# Patient Record
Sex: FEMALE | Race: BLACK OR AFRICAN AMERICAN | URBAN - METROPOLITAN AREA
[De-identification: names, ages, dates, MRNs, and addresses within clinical notes are randomized per-mention and may not be internally consistent; named-entity substitution may affect disease eponyms.]

---

## 2024-01-22 ENCOUNTER — TELEPHONE (OUTPATIENT)
Dept: FAMILY MEDICINE CLINIC | Age: 64
End: 2024-01-22

## 2024-01-22 ENCOUNTER — OFFICE VISIT (OUTPATIENT)
Dept: FAMILY MEDICINE CLINIC | Age: 64
End: 2024-01-22
Payer: COMMERCIAL

## 2024-01-22 VITALS
TEMPERATURE: 98.7 F | DIASTOLIC BLOOD PRESSURE: 88 MMHG | HEART RATE: 98 BPM | BODY MASS INDEX: 40.17 KG/M2 | HEIGHT: 60 IN | SYSTOLIC BLOOD PRESSURE: 137 MMHG | OXYGEN SATURATION: 96 % | WEIGHT: 204.6 LBS

## 2024-01-22 DIAGNOSIS — H57.9 ITCHY EYES: ICD-10-CM

## 2024-01-22 DIAGNOSIS — M25.532 LEFT WRIST PAIN: Primary | ICD-10-CM

## 2024-01-22 DIAGNOSIS — M25.561 CHRONIC PAIN OF RIGHT KNEE: ICD-10-CM

## 2024-01-22 DIAGNOSIS — M54.2 NECK PAIN: ICD-10-CM

## 2024-01-22 DIAGNOSIS — R13.10 DYSPHAGIA, UNSPECIFIED TYPE: ICD-10-CM

## 2024-01-22 DIAGNOSIS — R10.30 LOWER ABDOMINAL PAIN: ICD-10-CM

## 2024-01-22 DIAGNOSIS — M25.512 CHRONIC LEFT SHOULDER PAIN: ICD-10-CM

## 2024-01-22 DIAGNOSIS — Z78.9 PATIENT SPEAKS ONLY A FOREIGN LANGUAGE: ICD-10-CM

## 2024-01-22 DIAGNOSIS — G89.29 CHRONIC PAIN OF RIGHT KNEE: ICD-10-CM

## 2024-01-22 DIAGNOSIS — G89.29 CHRONIC LEFT SHOULDER PAIN: ICD-10-CM

## 2024-01-22 LAB
BILIRUBIN, POC: NEGATIVE
BLOOD URINE, POC: NEGATIVE
CLARITY, POC: ABNORMAL
COLOR, POC: ABNORMAL
GLUCOSE URINE, POC: NEGATIVE
KETONES, POC: NEGATIVE
LEUKOCYTE EST, POC: NEGATIVE
NITRITE, POC: NEGATIVE
PH, POC: 7
PROTEIN, POC: ABNORMAL
SPECIFIC GRAVITY, POC: 1.03
UROBILINOGEN, POC: 1

## 2024-01-22 PROCEDURE — 99204 OFFICE O/P NEW MOD 45 MIN: CPT | Performed by: FAMILY MEDICINE

## 2024-01-22 PROCEDURE — 81002 URINALYSIS NONAUTO W/O SCOPE: CPT | Performed by: FAMILY MEDICINE

## 2024-01-22 RX ORDER — POLYETHYLENE GLYCOL 400 AND PROPYLENE GLYCOL 4; 3 MG/ML; MG/ML
1 SOLUTION/ DROPS OPHTHALMIC 2 TIMES DAILY PRN
Qty: 5 ML | Refills: 0 | Status: SHIPPED | OUTPATIENT
Start: 2024-01-22

## 2024-01-22 RX ORDER — OLOPATADINE HYDROCHLORIDE 1 MG/ML
1 SOLUTION/ DROPS OPHTHALMIC 2 TIMES DAILY
Qty: 1 EACH | Refills: 11 | Status: SHIPPED | OUTPATIENT
Start: 2024-01-22 | End: 2024-01-22 | Stop reason: CLARIF

## 2024-01-22 RX ORDER — DIPHENHYDRAMINE HCL 25 MG
1 CAPSULE ORAL
Qty: 1 EACH | Refills: 1 | Status: SHIPPED | OUTPATIENT
Start: 2024-01-22

## 2024-01-22 RX ORDER — OMEPRAZOLE 40 MG/1
40 CAPSULE, DELAYED RELEASE ORAL
Qty: 30 CAPSULE | Refills: 0 | Status: SHIPPED | OUTPATIENT
Start: 2024-01-22

## 2024-01-22 NOTE — PATIENT INSTRUCTIONS
BRING YOUR MEDICATION BOTTLES TO ALL APPOINTMENTS    Check MY CHART for test results.  If you have forgotten your password, call [phone].    The diagnoses and medications listed in this after visit summary may not be up to date.  Check MY CHART in 28-48 hours for corrections.      Late cancellation policy: So that we can better accommodate people who are sick, please give our office 24 hour notice for an appointment cancellation.      Missed appointments: Your care is very important to us.  It is important that you keep your scheduled appointments.   Multiple missed appointments will lead to a dismissal from the office.      Patients arriving late will be worked into the schedule as time permits, with patients arriving on time taken as scheduled. Late arriving patients are more than welcome to wait or reschedule their appointments.    Please allow 5-7 business days for paperwork to be processed.

## 2024-01-22 NOTE — TELEPHONE ENCOUNTER
Confirmed from [facility] to cancel patanol eye drops.  Pharm said the Artifical Tears are recalled and he is unable to get them in.

## 2024-01-22 NOTE — PROGRESS NOTES
No nasal deformity, laceration, mucosal edema or rhinorrhea.      Mouth/Throat:      Lips: Pink.      Mouth: Mucous membranes are moist. No oral lesions.      Tongue: No lesions. Tongue does not deviate from midline.      Pharynx: Oropharynx is clear. No oropharyngeal exudate or posterior oropharyngeal erythema.      Tonsils: No tonsillar exudate or tonsillar abscesses.   Eyes:      General: Lids are normal. No allergic shiner or scleral icterus.        Right eye: No discharge.         Left eye: No discharge.      Conjunctiva/sclera: Conjunctivae normal.      Pupils: Pupils are equal, round, and reactive to light.      Comments: Eyes seem dry   Neck:      Thyroid: No thyroid mass or thyromegaly.      Trachea: No tracheal deviation.   Cardiovascular:      Rate and Rhythm: Normal rate and regular rhythm.      Heart sounds: Normal heart sounds, S1 normal and S2 normal.      No friction rub. No gallop.   Pulmonary:      Effort: Pulmonary effort is normal. No respiratory distress.      Breath sounds: Normal breath sounds. No wheezing or rales.   Abdominal:      General: There is no distension.      Palpations: Abdomen is soft. There is no mass.      Tenderness: There is abdominal tenderness in the suprapubic area.      Comments: Palpable c section scar   Musculoskeletal:      Left elbow: Normal range of motion.      Left wrist: Swelling and tenderness present. Normal range of motion.        Hands:       Cervical back: Neck supple.      Right lower leg: No edema.      Left lower leg: No edema.      Comments: NEgative spurling's bilaterally'    left shoulder:        Internal lag test positive for possible subscapularis injury        5/5 External rotation strength        Mike's/ Empty beer can sign Negative        Tenderness: left lateral neck, left shoulder joint, left upper arm                Lymphadenopathy:      Cervical:      Right cervical: No superficial, deep or posterior cervical adenopathy.     Left cervical: No

## 2024-02-12 ENCOUNTER — OFFICE VISIT (OUTPATIENT)
Dept: FAMILY MEDICINE CLINIC | Age: 64
End: 2024-02-12
Payer: COMMERCIAL

## 2024-02-12 VITALS
BODY MASS INDEX: 39.84 KG/M2 | OXYGEN SATURATION: 99 % | WEIGHT: 204 LBS | SYSTOLIC BLOOD PRESSURE: 146 MMHG | HEART RATE: 75 BPM | DIASTOLIC BLOOD PRESSURE: 98 MMHG

## 2024-02-12 DIAGNOSIS — G89.29 CHRONIC LEFT SHOULDER PAIN: ICD-10-CM

## 2024-02-12 DIAGNOSIS — R13.10 DYSPHAGIA, UNSPECIFIED TYPE: Primary | ICD-10-CM

## 2024-02-12 DIAGNOSIS — G89.29 CHRONIC PAIN OF RIGHT KNEE: ICD-10-CM

## 2024-02-12 DIAGNOSIS — Z78.9 PATIENT SPEAKS ONLY A FOREIGN LANGUAGE: ICD-10-CM

## 2024-02-12 DIAGNOSIS — Z12.31 ENCOUNTER FOR SCREENING MAMMOGRAM FOR MALIGNANT NEOPLASM OF BREAST: ICD-10-CM

## 2024-02-12 DIAGNOSIS — M25.561 CHRONIC PAIN OF RIGHT KNEE: ICD-10-CM

## 2024-02-12 DIAGNOSIS — M25.512 CHRONIC LEFT SHOULDER PAIN: ICD-10-CM

## 2024-02-12 DIAGNOSIS — H57.9 ITCHY EYES: ICD-10-CM

## 2024-02-12 PROCEDURE — 99213 OFFICE O/P EST LOW 20 MIN: CPT | Performed by: FAMILY MEDICINE

## 2024-02-12 RX ORDER — OMEPRAZOLE 40 MG/1
40 CAPSULE, DELAYED RELEASE ORAL
Qty: 90 CAPSULE | Refills: 3 | Status: SHIPPED | OUTPATIENT
Start: 2024-02-12

## 2024-02-12 RX ORDER — DIPHENHYDRAMINE HCL 25 MG
1 CAPSULE ORAL
Qty: 1 EACH | Refills: 11 | Status: SHIPPED | OUTPATIENT
Start: 2024-02-12

## 2024-02-12 NOTE — PATIENT INSTRUCTIONS
NEW OFFICE HOURS: M-TH 7AM-5PM      BRING YOUR MEDICATION BOTTLES TO ALL APPOINTMENTS    Check MY CHART for test results.  If you have forgotten your password, call [phone].    The diagnoses and medications listed in this after visit summary may not be up to date.  Check MY CHART in 28-48 hours for corrections.      Late cancellation policy: So that we can better accommodate people who are sick, please give our office 24 hour notice for an appointment cancellation.      Missed appointments: Your care is very important to us.  It is important that you keep your scheduled appointments.   Multiple missed appointments will lead to a dismissal from the office.      Patients arriving late will be worked into the schedule as time permits, with patients arriving on time taken as scheduled. Late arriving patients are more than welcome to wait or reschedule their appointments.    Please allow 5-7 business days for paperwork to be processed.

## 2024-02-12 NOTE — PROGRESS NOTES
Patient ID: Betsey Kirkpatrick 1960    Chief Complaint   Patient presents with         Tera #[id number]      Joint Pain    Pharyngitis     Says her throat feel little better    Loss of Vision    Abdominal Pain     Its better no more pain          HPI    Right knee : 10-20 years.  No injury.  Knee swells at times.  voltaren gel helps       Watery and itchy eyes:   15 years.   The eye drops help.  Still needs to get better glasses.     Abdominal pain:  Better with Prilosec      Sore throat: difficulty swallowing.  X 1 year. Better with Prilosec       Left hand pain: 2 years.   Worse with pressure.  Much better with Voltaren gel.  Has a \"ball\" in her wrist.  Has not seen ortho     Left shoulder pain: left .  2 years.  No injury.  better with Voltaren       Patient Active Problem List   Diagnosis    Patient speaks only a foreign language       Past Surgical History:   Procedure Laterality Date     SECTION      x 3       No family history on file.    Current Outpatient Medications on File Prior to Visit   Medication Sig Dispense Refill    polyethyl glycol-propyl glycol 0.4-0.3 % (SYSTANE) 0.4-0.3 % ophthalmic solution Place 1 drop into both eyes 2 times daily as needed for Dry Eyes 5 mL 0     No current facility-administered medications on file prior to visit.                   Objective:           Physical Exam  Vitals and nursing note reviewed.   Constitutional:       Appearance: She is well-developed. She is obese.      Comments: Non English speaking   HENT:      Head: Normocephalic and atraumatic.   Neck:      Thyroid: No thyromegaly.      Trachea: No tracheal deviation.   Cardiovascular:      Rate and Rhythm: Normal rate and regular rhythm.      Heart sounds: Normal heart sounds.   Pulmonary:      Effort: Pulmonary effort is normal. No respiratory distress.      Breath sounds: Normal breath sounds. No wheezing.   Abdominal:      General: There is no distension.      Palpations: Abdomen